# Patient Record
(demographics unavailable — no encounter records)

---

## 2024-11-14 NOTE — ASSESSMENT
[FreeTextEntry1] : 53-year-old female presents for evaluation of right index finger DIP nodule.  Upon inspecting patient's hand she has multiple nodules at all DIP joints of her hand.  Differential diagnosis include osteoarthritis of the DIP joint versus ganglion cyst.  Explained to the patient next with some action would be a x-ray of bilateral hands to better evaluate lesions.  Upon questioning the patient the patient also complained of electric-like tingling of stinging sensation in her thumb and first finger of the right hand.  Examined the patient with a positive Tinel's sign explained to the patient about carpal tunnel syndrome.  At this time she would warrant an EMG.  Patient does not want to pursue an EMG at this time.  Explained to the patient the risk and benefits of not diagnosing the carpal tunnel including decreased function of the first second and third fingers decreased sensation of those fingers and decreased motor function of the fingers.  Patient understands.  A total of 45 minutes was spent on encounter this patient.

## 2024-11-14 NOTE — HISTORY OF PRESENT ILLNESS
[FreeTextEntry1] : 53-year-old female presents for evaluation of DIP lesion to right index finger.  Patient states she has had this hard mass of the DIP for multiple years now.  Patient denies any trauma.  Patient denies any leakage of any fluid or any induration.

## 2024-11-14 NOTE — PHYSICAL EXAM
[de-identified] : multiple DIP hard nodules at all fingers r index .5cm x .5cm hard nodule  + tinel sign over right carpal tunnel